# Patient Record
Sex: FEMALE | Race: WHITE | NOT HISPANIC OR LATINO | Employment: OTHER | URBAN - METROPOLITAN AREA
[De-identification: names, ages, dates, MRNs, and addresses within clinical notes are randomized per-mention and may not be internally consistent; named-entity substitution may affect disease eponyms.]

---

## 2017-04-18 ENCOUNTER — TRANSCRIBE ORDERS (OUTPATIENT)
Dept: LAB | Facility: HOSPITAL | Age: 57
End: 2017-04-18

## 2017-04-18 ENCOUNTER — LAB (OUTPATIENT)
Dept: LAB | Facility: HOSPITAL | Age: 57
End: 2017-04-18

## 2017-04-18 DIAGNOSIS — J01.21 ACUTE RECURRENT ETHMOIDAL SINUSITIS: ICD-10-CM

## 2017-04-18 DIAGNOSIS — Z88.1 PERSONAL HISTORY OF ALLERGY TO OTHER ANTIBIOTIC AGENT: ICD-10-CM

## 2017-04-18 DIAGNOSIS — J32.2 CHRONIC ETHMOIDAL SINUSITIS: ICD-10-CM

## 2017-04-18 DIAGNOSIS — A31.9 UNSPECIFIED DISEASES DUE TO MYCOBACTERIA: ICD-10-CM

## 2017-04-18 DIAGNOSIS — Z88.1 PERSONAL HISTORY OF ALLERGY TO OTHER ANTIBIOTIC AGENT: Primary | ICD-10-CM

## 2017-04-18 DIAGNOSIS — B96.3 HAEMOPHILUS INFLUENZAE ARTHRITIS (HCC): ICD-10-CM

## 2017-04-18 DIAGNOSIS — M00.80 HAEMOPHILUS INFLUENZAE ARTHRITIS (HCC): ICD-10-CM

## 2017-04-18 LAB
ALBUMIN SERPL-MCNC: 4.4 G/DL (ref 3.2–4.8)
ALBUMIN/GLOB SERPL: 1.6 G/DL (ref 1.5–2.5)
ALP SERPL-CCNC: 107 U/L (ref 25–100)
ALT SERPL W P-5'-P-CCNC: 24 U/L (ref 7–40)
ANION GAP SERPL CALCULATED.3IONS-SCNC: 14 MMOL/L (ref 3–11)
AST SERPL-CCNC: 36 U/L (ref 0–33)
BASOPHILS # BLD AUTO: 0.05 10*3/MM3 (ref 0–0.2)
BASOPHILS NFR BLD AUTO: 0.7 % (ref 0–1)
BILIRUB SERPL-MCNC: 0.4 MG/DL (ref 0.3–1.2)
BUN BLD-MCNC: 23 MG/DL (ref 9–23)
BUN/CREAT SERPL: 28.8 (ref 7–25)
CALCIUM SPEC-SCNC: 9.8 MG/DL (ref 8.7–10.4)
CHLORIDE SERPL-SCNC: 100 MMOL/L (ref 99–109)
CO2 SERPL-SCNC: 22 MMOL/L (ref 20–31)
CREAT BLD-MCNC: 0.8 MG/DL (ref 0.6–1.3)
CRP SERPL-MCNC: 0.07 MG/DL (ref 0–1)
DEPRECATED RDW RBC AUTO: 44.8 FL (ref 37–54)
EOSINOPHIL # BLD AUTO: 0.16 10*3/MM3 (ref 0.1–0.3)
EOSINOPHIL NFR BLD AUTO: 2.3 % (ref 0–3)
ERYTHROCYTE [DISTWIDTH] IN BLOOD BY AUTOMATED COUNT: 12.8 % (ref 11.3–14.5)
ERYTHROCYTE [SEDIMENTATION RATE] IN BLOOD: 13 MM/HR (ref 0–30)
GFR SERPL CREATININE-BSD FRML MDRD: 75 ML/MIN/1.73
GLOBULIN UR ELPH-MCNC: 2.8 GM/DL
GLUCOSE BLD-MCNC: 76 MG/DL (ref 70–100)
HCT VFR BLD AUTO: 39.2 % (ref 34.5–44)
HGB BLD-MCNC: 12.6 G/DL (ref 11.5–15.5)
IMM GRANULOCYTES # BLD: 0.01 10*3/MM3 (ref 0–0.03)
IMM GRANULOCYTES NFR BLD: 0.1 % (ref 0–0.6)
LYMPHOCYTES # BLD AUTO: 2.42 10*3/MM3 (ref 0.6–4.8)
LYMPHOCYTES NFR BLD AUTO: 35.3 % (ref 24–44)
MCH RBC QN AUTO: 30.9 PG (ref 27–31)
MCHC RBC AUTO-ENTMCNC: 32.1 G/DL (ref 32–36)
MCV RBC AUTO: 96.1 FL (ref 80–99)
MONOCYTES # BLD AUTO: 0.49 10*3/MM3 (ref 0–1)
MONOCYTES NFR BLD AUTO: 7.1 % (ref 0–12)
NEUTROPHILS # BLD AUTO: 3.73 10*3/MM3 (ref 1.5–8.3)
NEUTROPHILS NFR BLD AUTO: 54.5 % (ref 41–71)
PLATELET # BLD AUTO: 206 10*3/MM3 (ref 150–450)
PMV BLD AUTO: 10.7 FL (ref 6–12)
POTASSIUM BLD-SCNC: 4.3 MMOL/L (ref 3.5–5.5)
PROT SERPL-MCNC: 7.2 G/DL (ref 5.7–8.2)
RBC # BLD AUTO: 4.08 10*6/MM3 (ref 3.89–5.14)
SODIUM BLD-SCNC: 136 MMOL/L (ref 132–146)
WBC NRBC COR # BLD: 6.86 10*3/MM3 (ref 3.5–10.8)

## 2017-04-18 PROCEDURE — 85025 COMPLETE CBC W/AUTO DIFF WBC: CPT

## 2017-04-18 PROCEDURE — 80053 COMPREHEN METABOLIC PANEL: CPT | Performed by: INTERNAL MEDICINE

## 2017-04-18 PROCEDURE — 85652 RBC SED RATE AUTOMATED: CPT | Performed by: INTERNAL MEDICINE

## 2017-04-18 PROCEDURE — 36415 COLL VENOUS BLD VENIPUNCTURE: CPT | Performed by: INTERNAL MEDICINE

## 2017-04-18 PROCEDURE — 86140 C-REACTIVE PROTEIN: CPT | Performed by: INTERNAL MEDICINE

## 2017-04-19 ENCOUNTER — TRANSCRIBE ORDERS (OUTPATIENT)
Dept: ADMINISTRATIVE | Facility: HOSPITAL | Age: 57
End: 2017-04-19

## 2017-04-19 DIAGNOSIS — J32.2 CHRONIC ETHMOIDAL SINUSITIS: Primary | ICD-10-CM

## 2017-04-21 ENCOUNTER — HOSPITAL ENCOUNTER (OUTPATIENT)
Dept: CT IMAGING | Facility: HOSPITAL | Age: 57
Discharge: HOME | End: 2017-04-21
Attending: INTERNAL MEDICINE | Admitting: INTERNAL MEDICINE

## 2017-04-21 DIAGNOSIS — J32.2 CHRONIC ETHMOIDAL SINUSITIS: ICD-10-CM

## 2017-04-21 PROCEDURE — 70486 CT MAXILLOFACIAL W/O DYE: CPT

## 2017-05-01 ENCOUNTER — LAB (OUTPATIENT)
Dept: LAB | Facility: HOSPITAL | Age: 57
End: 2017-05-01

## 2017-05-01 ENCOUNTER — TRANSCRIBE ORDERS (OUTPATIENT)
Dept: LAB | Facility: HOSPITAL | Age: 57
End: 2017-05-01

## 2017-05-01 ENCOUNTER — TRANSCRIBE ORDERS (OUTPATIENT)
Dept: ADMINISTRATIVE | Facility: HOSPITAL | Age: 57
End: 2017-05-01

## 2017-05-01 DIAGNOSIS — J32.2 CHRONIC ETHMOIDAL SINUSITIS: ICD-10-CM

## 2017-05-01 DIAGNOSIS — J32.2 CHRONIC ETHMOIDAL SINUSITIS: Primary | ICD-10-CM

## 2017-05-01 DIAGNOSIS — J01.21 ACUTE RECURRENT ETHMOIDAL SINUSITIS: Primary | ICD-10-CM

## 2017-05-01 LAB
ALBUMIN SERPL-MCNC: 4.3 G/DL (ref 3.2–4.8)
ALBUMIN/GLOB SERPL: 1.8 G/DL (ref 1.5–2.5)
ALP SERPL-CCNC: 95 U/L (ref 25–100)
ALT SERPL W P-5'-P-CCNC: 26 U/L (ref 7–40)
ANION GAP SERPL CALCULATED.3IONS-SCNC: 6 MMOL/L (ref 3–11)
AST SERPL-CCNC: 31 U/L (ref 0–33)
BASOPHILS # BLD AUTO: 0.03 10*3/MM3 (ref 0–0.2)
BASOPHILS NFR BLD AUTO: 0.6 % (ref 0–1)
BILIRUB SERPL-MCNC: 0.4 MG/DL (ref 0.3–1.2)
BUN BLD-MCNC: 20 MG/DL (ref 9–23)
BUN/CREAT SERPL: 28.6 (ref 7–25)
CALCIUM SPEC-SCNC: 9.7 MG/DL (ref 8.7–10.4)
CHLORIDE SERPL-SCNC: 102 MMOL/L (ref 99–109)
CO2 SERPL-SCNC: 32 MMOL/L (ref 20–31)
CREAT BLD-MCNC: 0.7 MG/DL (ref 0.6–1.3)
CRP SERPL-MCNC: 0.12 MG/DL (ref 0–1)
DEPRECATED RDW RBC AUTO: 46.6 FL (ref 37–54)
EOSINOPHIL # BLD AUTO: 0.19 10*3/MM3 (ref 0.1–0.3)
EOSINOPHIL NFR BLD AUTO: 3.5 % (ref 0–3)
ERYTHROCYTE [DISTWIDTH] IN BLOOD BY AUTOMATED COUNT: 13.1 % (ref 11.3–14.5)
ERYTHROCYTE [SEDIMENTATION RATE] IN BLOOD: 12 MM/HR (ref 0–30)
GFR SERPL CREATININE-BSD FRML MDRD: 87 ML/MIN/1.73
GLOBULIN UR ELPH-MCNC: 2.4 GM/DL
GLUCOSE BLD-MCNC: 102 MG/DL (ref 70–100)
HCT VFR BLD AUTO: 41.8 % (ref 34.5–44)
HGB BLD-MCNC: 13.3 G/DL (ref 11.5–15.5)
IMM GRANULOCYTES # BLD: 0.01 10*3/MM3 (ref 0–0.03)
IMM GRANULOCYTES NFR BLD: 0.2 % (ref 0–0.6)
LYMPHOCYTES # BLD AUTO: 2.92 10*3/MM3 (ref 0.6–4.8)
LYMPHOCYTES NFR BLD AUTO: 54.5 % (ref 24–44)
MCH RBC QN AUTO: 31 PG (ref 27–31)
MCHC RBC AUTO-ENTMCNC: 31.8 G/DL (ref 32–36)
MCV RBC AUTO: 97.4 FL (ref 80–99)
MONOCYTES # BLD AUTO: 0.5 10*3/MM3 (ref 0–1)
MONOCYTES NFR BLD AUTO: 9.3 % (ref 0–12)
NEUTROPHILS # BLD AUTO: 1.71 10*3/MM3 (ref 1.5–8.3)
NEUTROPHILS NFR BLD AUTO: 31.9 % (ref 41–71)
PLATELET # BLD AUTO: 211 10*3/MM3 (ref 150–450)
PMV BLD AUTO: 10.4 FL (ref 6–12)
POTASSIUM BLD-SCNC: 4.7 MMOL/L (ref 3.5–5.5)
PROT SERPL-MCNC: 6.7 G/DL (ref 5.7–8.2)
RBC # BLD AUTO: 4.29 10*6/MM3 (ref 3.89–5.14)
SODIUM BLD-SCNC: 140 MMOL/L (ref 132–146)
WBC NRBC COR # BLD: 5.36 10*3/MM3 (ref 3.5–10.8)

## 2017-05-01 PROCEDURE — 36415 COLL VENOUS BLD VENIPUNCTURE: CPT | Performed by: INTERNAL MEDICINE

## 2017-05-01 PROCEDURE — 80053 COMPREHEN METABOLIC PANEL: CPT | Performed by: INTERNAL MEDICINE

## 2017-05-01 PROCEDURE — 86140 C-REACTIVE PROTEIN: CPT | Performed by: INTERNAL MEDICINE

## 2017-05-01 PROCEDURE — 85652 RBC SED RATE AUTOMATED: CPT | Performed by: INTERNAL MEDICINE

## 2017-05-01 PROCEDURE — 85025 COMPLETE CBC W/AUTO DIFF WBC: CPT | Performed by: INTERNAL MEDICINE

## 2017-05-08 ENCOUNTER — HOSPITAL ENCOUNTER (OUTPATIENT)
Dept: CT IMAGING | Facility: HOSPITAL | Age: 57
Discharge: HOME | End: 2017-05-08
Attending: INTERNAL MEDICINE | Admitting: INTERNAL MEDICINE

## 2017-05-08 DIAGNOSIS — J01.21 ACUTE RECURRENT ETHMOIDAL SINUSITIS: ICD-10-CM

## 2017-05-08 PROCEDURE — 70486 CT MAXILLOFACIAL W/O DYE: CPT

## 2017-11-13 ENCOUNTER — TRANSCRIBE ORDERS (OUTPATIENT)
Dept: MAMMOGRAPHY | Facility: HOSPITAL | Age: 57
End: 2017-11-13

## 2017-11-13 DIAGNOSIS — Z12.31 VISIT FOR SCREENING MAMMOGRAM: Primary | ICD-10-CM

## 2017-12-04 ENCOUNTER — HOSPITAL ENCOUNTER (OUTPATIENT)
Dept: MAMMOGRAPHY | Facility: HOSPITAL | Age: 57
Discharge: HOME | End: 2017-12-04
Attending: OBSTETRICS & GYNECOLOGY | Admitting: OBSTETRICS & GYNECOLOGY

## 2017-12-04 DIAGNOSIS — Z12.31 VISIT FOR SCREENING MAMMOGRAM: ICD-10-CM

## 2017-12-04 PROCEDURE — G0202 SCR MAMMO BI INCL CAD: HCPCS

## 2017-12-04 PROCEDURE — 77067 SCR MAMMO BI INCL CAD: CPT | Performed by: RADIOLOGY

## 2017-12-04 PROCEDURE — 77063 BREAST TOMOSYNTHESIS BI: CPT | Performed by: RADIOLOGY

## 2017-12-04 PROCEDURE — 77063 BREAST TOMOSYNTHESIS BI: CPT

## 2019-03-01 ENCOUNTER — LAB REQUISITION (OUTPATIENT)
Dept: LAB | Facility: HOSPITAL | Age: 59
End: 2019-03-01

## 2019-03-01 DIAGNOSIS — R06.00 DYSPNEA: ICD-10-CM

## 2019-03-01 DIAGNOSIS — Z00.00 ROUTINE GENERAL MEDICAL EXAMINATION AT A HEALTH CARE FACILITY: ICD-10-CM

## 2019-03-01 LAB — D DIMER PPP FEU-MCNC: 0.44 MCGFEU/ML (ref 0–0.56)

## 2019-03-01 PROCEDURE — 85379 FIBRIN DEGRADATION QUANT: CPT | Performed by: INTERNAL MEDICINE

## 2019-05-29 ENCOUNTER — TRANSCRIBE ORDERS (OUTPATIENT)
Dept: ADMINISTRATIVE | Facility: HOSPITAL | Age: 59
End: 2019-05-29

## 2019-05-29 DIAGNOSIS — Z12.31 VISIT FOR SCREENING MAMMOGRAM: Primary | ICD-10-CM

## 2019-06-24 ENCOUNTER — HOSPITAL ENCOUNTER (OUTPATIENT)
Dept: MAMMOGRAPHY | Facility: HOSPITAL | Age: 59
Discharge: HOME | End: 2019-06-24
Admitting: OBSTETRICS & GYNECOLOGY

## 2019-06-24 DIAGNOSIS — Z12.31 VISIT FOR SCREENING MAMMOGRAM: ICD-10-CM

## 2019-06-24 PROCEDURE — 77067 SCR MAMMO BI INCL CAD: CPT

## 2019-06-24 PROCEDURE — 77067 SCR MAMMO BI INCL CAD: CPT | Performed by: RADIOLOGY

## 2019-06-24 PROCEDURE — 77063 BREAST TOMOSYNTHESIS BI: CPT | Performed by: RADIOLOGY

## 2019-06-24 PROCEDURE — 77063 BREAST TOMOSYNTHESIS BI: CPT

## 2020-09-28 ENCOUNTER — TRANSCRIBE ORDERS (OUTPATIENT)
Dept: OBSTETRICS AND GYNECOLOGY | Facility: CLINIC | Age: 60
End: 2020-09-28

## 2020-09-28 DIAGNOSIS — Z12.31 VISIT FOR SCREENING MAMMOGRAM: Primary | ICD-10-CM

## 2020-09-30 ENCOUNTER — HOSPITAL ENCOUNTER (OUTPATIENT)
Dept: MAMMOGRAPHY | Facility: HOSPITAL | Age: 60
Discharge: HOME | End: 2020-09-30
Admitting: OBSTETRICS & GYNECOLOGY

## 2020-09-30 DIAGNOSIS — Z12.31 VISIT FOR SCREENING MAMMOGRAM: ICD-10-CM

## 2020-09-30 PROCEDURE — 77067 SCR MAMMO BI INCL CAD: CPT | Performed by: RADIOLOGY

## 2020-09-30 PROCEDURE — 77063 BREAST TOMOSYNTHESIS BI: CPT | Performed by: RADIOLOGY

## 2020-09-30 PROCEDURE — 77067 SCR MAMMO BI INCL CAD: CPT

## 2020-09-30 PROCEDURE — 77063 BREAST TOMOSYNTHESIS BI: CPT

## 2021-04-19 ENCOUNTER — TELEPHONE (OUTPATIENT)
Dept: OBSTETRICS AND GYNECOLOGY | Facility: CLINIC | Age: 61
End: 2021-04-19

## 2021-04-19 ENCOUNTER — OFFICE VISIT (OUTPATIENT)
Dept: OBSTETRICS AND GYNECOLOGY | Facility: CLINIC | Age: 61
End: 2021-04-19

## 2021-04-19 VITALS — HEIGHT: 66 IN

## 2021-04-19 DIAGNOSIS — Z53.21 PATIENT LEFT WITHOUT BEING SEEN: Primary | ICD-10-CM

## 2021-04-26 NOTE — PROGRESS NOTES
The patient left the office after care was provided and did not complete the visit for unknown reasons.

## 2021-06-02 ENCOUNTER — OFFICE VISIT (OUTPATIENT)
Dept: OBSTETRICS AND GYNECOLOGY | Facility: CLINIC | Age: 61
End: 2021-06-02

## 2021-06-02 VITALS
DIASTOLIC BLOOD PRESSURE: 56 MMHG | SYSTOLIC BLOOD PRESSURE: 102 MMHG | BODY MASS INDEX: 20.25 KG/M2 | WEIGHT: 126 LBS | HEIGHT: 66 IN

## 2021-06-02 DIAGNOSIS — Z01.419 WOMEN'S ANNUAL ROUTINE GYNECOLOGICAL EXAMINATION: Primary | ICD-10-CM

## 2021-06-02 PROCEDURE — 99396 PREV VISIT EST AGE 40-64: CPT | Performed by: NURSE PRACTITIONER

## 2021-06-02 RX ORDER — PRAVASTATIN SODIUM 20 MG
TABLET ORAL
COMMUNITY
Start: 2021-05-07

## 2021-06-02 RX ORDER — APIXABAN 5 MG/1
TABLET, FILM COATED ORAL
COMMUNITY
Start: 2021-04-26

## 2021-06-02 RX ORDER — ASPIRIN 81 MG/1
TABLET ORAL
COMMUNITY

## 2021-06-02 RX ORDER — METHOCARBAMOL 500 MG/1
TABLET, FILM COATED ORAL
COMMUNITY
Start: 2021-05-27

## 2021-06-02 RX ORDER — FLECAINIDE ACETATE 50 MG/1
TABLET ORAL
COMMUNITY
Start: 2021-06-01

## 2021-06-02 NOTE — PROGRESS NOTES
GYN Annual Exam     CC - Here for annual exam.        HPI  Lesa Garnett is a 60 y.o. female, , who presents for annual well woman exam.  She is postmenopausal.  Patient denies vaginal bleeding. ..  Patient reports problems with: none. There were no changes to her medical or surgical history since her last visit.. Partner Status: Marital Status: .  New Partners since last visit: no.      Additional OB/GYN History   Current contraception: contraceptive methods: Post menopausal status  Desires to: do not start contraception  On HRT? No  Last Pap : 2020- negative  Last Completed Pap Smear          Ordered - PAP SMEAR (Every 3 Years) Ordered on 2018  Done - negative              History of abnormal Pap smear: no  Family history of uterine, colon, breast, or ovarian cancer: no  Performs monthly Self-Breast Exam: yes  Last mammogram: 20 negative  Last Completed Mammogram          MAMMOGRAM (Every 2 Years) Next due on 2020  Mammo Screening Digital Tomosynthesis Bilateral With CAD    2019  Mammo Screening Digital Tomosynthesis Bilateral With CAD    2017  Mammo screening digital tomosynthesis bilateral w CAD    2016  Mammo screening digital tomosynthesis bilateral w cad    2015  MAMMOGRAPHY SCREENING BILATERAL    Only the first 5 history entries have been loaded, but more history exists.              Last colonoscopy: 2020  benign polyps (next colonoscopy due in )  Last Completed Colonoscopy     This patient has no relevant Health Maintenance data.        Last DEXA: yes,x 7 years ago and results were Normal  Exercises Regularly: yes  Feelings of Anxiety or Depression: no      Tobacco Usage?: No   OB History        2    Para   2    Term   2            AB        Living           SAB        TAB        Ectopic        Molar        Multiple        Live Births                    Health Maintenance   Topic Date Due   • Annual  "Gynecologic Pelvic and Breast Exam  Never done   • ANNUAL PHYSICAL  Never done   • Pneumococcal Vaccine 0-64 (1 of 1 - PPSV23) Never done   • ZOSTER VACCINE (1 of 2) Never done   • HEPATITIS C SCREENING  Never done   • INFLUENZA VACCINE  08/01/2021   • PAP SMEAR  12/11/2021   • MAMMOGRAM  09/30/2022   • TDAP/TD VACCINES (3 - Td or Tdap) 07/06/2025   • COLORECTAL CANCER SCREENING  11/13/2025   • COVID-19 Vaccine  Completed       The additional following portions of the patient's history were reviewed and updated as appropriate: allergies, current medications, past family history, past medical history, past social history, past surgical history and problem list.    Review of Systems   Constitutional: Negative.    Cardiovascular: Negative.    Gastrointestinal: Negative.    Endocrine: Negative.    Genitourinary: Negative.    Musculoskeletal: Negative.    Skin: Negative.    Psychiatric/Behavioral: Negative.        I have reviewed and agree with the HPI, ROS, and historical information as entered above. Darcy Angel, APRN    Objective   /56   Ht 167.6 cm (65.98\")   Wt 57.2 kg (126 lb)   BMI 20.35 kg/m²     Physical Exam  Exam conducted with a chaperone present.   Constitutional:       Appearance: Normal appearance. She is normal weight.   Cardiovascular:      Rate and Rhythm: Normal rate and regular rhythm.   Chest:      Breasts:         Right: Normal.         Left: Normal.   Abdominal:      Palpations: Abdomen is soft.   Genitourinary:     General: Normal vulva.      Vagina: Normal.      Cervix: Normal.      Uterus: Normal.       Adnexa: Right adnexa normal and left adnexa normal.      Rectum: Normal.   Neurological:      Mental Status: She is alert.            Assessment and Plan    Problem List Items Addressed This Visit     None      Visit Diagnoses     Women's annual routine gynecological examination    -  Primary    Relevant Orders    Pap IG, HPV-hr          1. GYN annual well woman exam.  2. Mammogram " due in 9/2021  3. Colonoscopy due in 2025   4. Reviewed monthly self breast exams.  Instructed to call with lumps, pain, or breast discharge.  Yearly mammograms ordered.  5. Recommended use of Vitamin D and getting adequate calcium in her diet. (1500mg)  6. Reccommended Flu Vaccine in Fall of each year.  7. RTC in 1 year or PRN with problems.    Darcy Ortiz, VONDA  06/02/2021

## 2021-06-08 DIAGNOSIS — Z01.419 WOMEN'S ANNUAL ROUTINE GYNECOLOGICAL EXAMINATION: ICD-10-CM

## 2021-06-18 ENCOUNTER — OFFICE VISIT (OUTPATIENT)
Dept: URBAN - METROPOLITAN AREA CLINIC 98 | Facility: CLINIC | Age: 61
End: 2021-06-18

## 2022-01-04 ENCOUNTER — TRANSCRIBE ORDERS (OUTPATIENT)
Dept: ADMINISTRATIVE | Facility: HOSPITAL | Age: 62
End: 2022-01-04

## 2022-01-04 DIAGNOSIS — Z12.31 VISIT FOR SCREENING MAMMOGRAM: Primary | ICD-10-CM

## 2022-01-07 ENCOUNTER — APPOINTMENT (OUTPATIENT)
Dept: MAMMOGRAPHY | Facility: HOSPITAL | Age: 62
End: 2022-01-07

## 2022-01-20 ENCOUNTER — HOSPITAL ENCOUNTER (OUTPATIENT)
Dept: MAMMOGRAPHY | Facility: HOSPITAL | Age: 62
Discharge: HOME | End: 2022-01-20
Admitting: OBSTETRICS & GYNECOLOGY

## 2022-01-20 DIAGNOSIS — Z12.31 VISIT FOR SCREENING MAMMOGRAM: ICD-10-CM

## 2022-01-20 PROCEDURE — 77063 BREAST TOMOSYNTHESIS BI: CPT | Performed by: RADIOLOGY

## 2022-01-20 PROCEDURE — 77063 BREAST TOMOSYNTHESIS BI: CPT

## 2022-01-20 PROCEDURE — 77067 SCR MAMMO BI INCL CAD: CPT | Performed by: RADIOLOGY

## 2022-01-20 PROCEDURE — 77067 SCR MAMMO BI INCL CAD: CPT

## 2022-06-06 ENCOUNTER — OFFICE VISIT (OUTPATIENT)
Dept: OBSTETRICS AND GYNECOLOGY | Facility: CLINIC | Age: 62
End: 2022-06-06

## 2022-06-06 VITALS
DIASTOLIC BLOOD PRESSURE: 76 MMHG | HEIGHT: 66 IN | WEIGHT: 129.4 LBS | SYSTOLIC BLOOD PRESSURE: 116 MMHG | BODY MASS INDEX: 20.79 KG/M2

## 2022-06-06 DIAGNOSIS — Z01.419 ROUTINE GYNECOLOGICAL EXAMINATION: Primary | ICD-10-CM

## 2022-06-06 PROCEDURE — 99396 PREV VISIT EST AGE 40-64: CPT | Performed by: NURSE PRACTITIONER

## 2022-06-06 RX ORDER — CELECOXIB 200 MG/1
CAPSULE ORAL
COMMUNITY

## 2022-06-06 RX ORDER — ATORVASTATIN CALCIUM 40 MG/1
TABLET, FILM COATED ORAL
COMMUNITY
Start: 2022-05-25

## 2022-06-06 RX ORDER — FERROUS SULFATE 325(65) MG
TABLET ORAL
COMMUNITY

## 2022-06-06 RX ORDER — TIZANIDINE 4 MG/1
TABLET ORAL
COMMUNITY

## 2022-06-06 RX ORDER — CEFDINIR 300 MG/1
CAPSULE ORAL
COMMUNITY
Start: 2022-04-10

## 2022-06-06 RX ORDER — ASCORBIC ACID 500 MG
TABLET ORAL EVERY 12 HOURS SCHEDULED
COMMUNITY

## 2022-06-06 RX ORDER — DOXYCYCLINE HYCLATE 100 MG/1
CAPSULE ORAL
COMMUNITY

## 2022-06-06 RX ORDER — FAMOTIDINE 20 MG/1
TABLET, FILM COATED ORAL
COMMUNITY

## 2022-06-06 NOTE — PROGRESS NOTES
GYN Annual Exam     CC - Here for annual exam.     Subjective   HPI  Lesa Garnett is a 61 y.o. female, , who presents for annual well woman exam.  She is postmenopausal.  Patient reports problems with: none.  Partner Status: Marital Status: .  New Partners since last visit: no.      Additional OB/GYN History   Current contraception: contraceptive methods: Post menopausal status  Desires to: do not start contraception  Last Pap : Neg HPV: Neg 2021  Last Completed Pap Smear          Ordered - PAP SMEAR (Every 3 Years) Ordered on 2021  SCANNED - PAP SMEAR    2018  Done - negative              History of abnormal Pap smear: no  Family history of uterine, colon, breast, or ovarian cancer: no  Performs monthly Self-Breast Exam: yes  Last mammogram:   Last Completed Mammogram          MAMMOGRAM (Every 2 Years) Next due on 2022  Mammo Screening Digital Tomosynthesis Bilateral With CAD    2020  Mammo Screening Digital Tomosynthesis Bilateral With CAD    2019  Mammo Screening Digital Tomosynthesis Bilateral With CAD    2017  Mammo screening digital tomosynthesis bilateral w CAD    2016  Mammo screening digital tomosynthesis bilateral w cad    Only the first 5 history entries have been loaded, but more history exists.              Last colonoscopy:   Last Completed Colonoscopy          COLORECTAL CANCER SCREENING (COLONOSCOPY - Every 5 Years) Next due on 2025  SCANNED - COLONOSCOPY    10/30/2015  COLONOSCOPY (Done - benign polyp removed)              Last DEXA: 8 years ago per pt   Exercises Regularly: yes  Feelings of Anxiety or Depression: no    Tobacco Usage?: No   OB History        2    Para   2    Term   2            AB        Living   2       SAB        IAB        Ectopic        Molar        Multiple        Live Births                    Health Maintenance   Topic Date Due   • DXA SCAN  Never done  "  • ANNUAL PHYSICAL  Never done   • ZOSTER VACCINE (1 of 2) Never done   • HEPATITIS C SCREENING  Never done   • COVID-19 Vaccine (4 - Booster for Moderna series) 03/08/2022   • Annual Gynecologic Pelvic and Breast Exam  06/03/2022   • INFLUENZA VACCINE  08/01/2022   • MAMMOGRAM  01/20/2024   • PAP SMEAR  06/02/2024   • COLORECTAL CANCER SCREENING  11/13/2025   • TDAP/TD VACCINES (4 - Td or Tdap) 07/04/2031   • Pneumococcal Vaccine 0-64  Aged Out       The additional following portions of the patient's history were reviewed and updated as appropriate: allergies, current medications, past family history, past medical history, past social history and past surgical history.    Review of Systems   Constitutional: Negative.    Respiratory: Negative.    Cardiovascular: Negative.    Gastrointestinal: Negative.    Genitourinary: Negative.    Psychiatric/Behavioral: Negative.        I have reviewed and agree with the HPI, ROS, and historical information as entered above. Darcy Ortiz, APRN    Objective   /76 (BP Location: Right arm, Patient Position: Sitting, Cuff Size: Adult)   Ht 167.6 cm (65.98\")   Wt 58.7 kg (129 lb 6.4 oz)   Breastfeeding No   BMI 20.90 kg/m²     Physical Exam  Vitals and nursing note reviewed. Exam conducted with a chaperone present.   Constitutional:       Appearance: She is well-developed.   HENT:      Head: Normocephalic and atraumatic.   Neck:      Thyroid: No thyroid mass or thyromegaly.   Cardiovascular:      Rate and Rhythm: Normal rate and regular rhythm.      Heart sounds: No murmur heard.  Pulmonary:      Effort: Pulmonary effort is normal. No retractions.      Breath sounds: Normal breath sounds. No wheezing, rhonchi or rales.   Chest:      Chest wall: No mass or tenderness.   Breasts:      Right: Normal. No mass, nipple discharge, skin change or tenderness.      Left: Normal. No mass, nipple discharge, skin change or tenderness.       Abdominal:      General: Bowel sounds are " normal.      Palpations: Abdomen is soft. Abdomen is not rigid. There is no mass.      Tenderness: There is no abdominal tenderness. There is no guarding.      Hernia: No hernia is present.   Genitourinary:     General: Normal vulva.      Labia:         Right: No rash, tenderness or lesion.         Left: No rash, tenderness or lesion.       Vagina: Normal. No vaginal discharge or lesions.      Cervix: No cervical motion tenderness, discharge, lesion or cervical bleeding.      Uterus: Normal. Not enlarged, not fixed and not tender.       Adnexa: Right adnexa normal and left adnexa normal.        Right: No mass or tenderness.          Left: No mass or tenderness.        Rectum: Normal. No external hemorrhoid.   Musculoskeletal:      Cervical back: Normal range of motion. No muscular tenderness.   Neurological:      Mental Status: She is alert and oriented to person, place, and time.   Psychiatric:         Behavior: Behavior normal.         Assessment & Plan     Assessment     Problem List Items Addressed This Visit    None     Visit Diagnoses     Routine gynecological examination    -  Primary          1. GYN annual well woman exam.       Plan     1. Reviewed monthly self breast exams.  Instructed to call with lumps, pain, or breast discharge.  Yearly mammograms ordered.  2. Recommended use of Vitamin D and getting adequate calcium in her diet. (1500mg)  3. Reviewed exercise as a preventative health measures.   4. Reccommended Flu Vaccine in Fall of each year.  5. RTC in 1 year or PRN with problems.   6. Had colonoscopy 11/13/2020  7. Had normal mammogram 1/202022  8. Acog pap guidelines reviewed with pt. Normal pap, negative HPV 6/2/2021  9. Will schedule BDS since it has been many years ago since her last BDS. Mom h/o osteoporosis.     Darcy Ortiz, VONDA  06/06/2022

## 2022-06-13 DIAGNOSIS — Z13.820 ENCOUNTER FOR SCREENING FOR OSTEOPOROSIS: Primary | ICD-10-CM

## 2022-10-12 ENCOUNTER — TELEPHONE (OUTPATIENT)
Dept: OBSTETRICS AND GYNECOLOGY | Facility: CLINIC | Age: 62
End: 2022-10-12

## 2022-10-12 NOTE — TELEPHONE ENCOUNTER
Pt states she has an abnormal discharge that started on Sam 10/9/22. Denies any odor, ithing, pain, or swelling. Denies any changes in detergents, soap, or underwear type. Denies any new sexual partners or the need for STD testing. Pts last annual was 6/6/22. Told her I will speak with Darcy and call back. Pt NANDO

## 2022-10-12 NOTE — TELEPHONE ENCOUNTER
Spoke with Darcy FERRARI. Darcy said without any other symptoms we cannot treat over the phone. Patient would need to schedule an appointment for treatment. Yellow discharge may be from hormonal changes and pt should continue to monitor.    Informed patient. Without any other symptoms Darcy cannot treat her over the phone. Informed pt the discharge could be due to change in hormones and she should continue to monitor for now. If patient has a foul odor, itching, burning, or pain then she should call to schedule an appointment to be swabbed. If the discharge does not go away or becomes worrisome pt will call our office to schedule an appointment. Davon COLLIER

## 2023-03-03 ENCOUNTER — TRANSCRIBE ORDERS (OUTPATIENT)
Dept: ADMINISTRATIVE | Facility: HOSPITAL | Age: 63
End: 2023-03-03
Payer: COMMERCIAL

## 2023-03-03 DIAGNOSIS — Z12.31 VISIT FOR SCREENING MAMMOGRAM: Primary | ICD-10-CM

## 2023-04-14 ENCOUNTER — HOSPITAL ENCOUNTER (OUTPATIENT)
Dept: MAMMOGRAPHY | Facility: HOSPITAL | Age: 63
Discharge: HOME | End: 2023-04-14
Admitting: NURSE PRACTITIONER
Payer: COMMERCIAL

## 2023-04-14 DIAGNOSIS — Z12.31 VISIT FOR SCREENING MAMMOGRAM: ICD-10-CM

## 2023-04-14 PROCEDURE — 77063 BREAST TOMOSYNTHESIS BI: CPT

## 2023-04-14 PROCEDURE — 77067 SCR MAMMO BI INCL CAD: CPT

## 2025-01-14 ENCOUNTER — APPOINTMENT (OUTPATIENT)
Dept: URBAN - METROPOLITAN AREA OTHER 4 | Facility: OTHER | Age: 65
Setting detail: DERMATOLOGY
End: 2025-01-14

## 2025-01-14 DIAGNOSIS — L85.3 XEROSIS CUTIS: ICD-10-CM

## 2025-01-14 DIAGNOSIS — L82.1 OTHER SEBORRHEIC KERATOSIS: ICD-10-CM

## 2025-01-14 PROCEDURE — ? TREATMENT REGIMEN

## 2025-01-14 PROCEDURE — ? COUNSELING

## 2025-01-14 PROCEDURE — 99203 OFFICE O/P NEW LOW 30 MIN: CPT

## 2025-01-14 ASSESSMENT — LOCATION DETAILED DESCRIPTION DERM
LOCATION DETAILED: LEFT CENTRAL MALAR CHEEK
LOCATION DETAILED: LEFT SUPERIOR CENTRAL MALAR CHEEK
LOCATION DETAILED: LEFT CENTRAL ZYGOMA
LOCATION DETAILED: RIGHT CENTRAL MALAR CHEEK

## 2025-01-14 ASSESSMENT — LOCATION SIMPLE DESCRIPTION DERM
LOCATION SIMPLE: LEFT CHEEK
LOCATION SIMPLE: LEFT ZYGOMA
LOCATION SIMPLE: RIGHT CHEEK

## 2025-01-14 ASSESSMENT — LOCATION ZONE DERM: LOCATION ZONE: FACE

## 2025-01-14 NOTE — PROCEDURE: TREATMENT REGIMEN
Plan: Recommended pt to use CeraVe daily moisturizing lotion and hydrating cream to foam cleanser.
Samples Given: CeraVe Hydrating Cleanser \\n\\nCeraVe Ultra light moisturizing lotion
Detail Level: Detailed